# Patient Record
Sex: FEMALE | Race: WHITE | URBAN - METROPOLITAN AREA
[De-identification: names, ages, dates, MRNs, and addresses within clinical notes are randomized per-mention and may not be internally consistent; named-entity substitution may affect disease eponyms.]

---

## 2024-02-26 ENCOUNTER — TELEPHONE (OUTPATIENT)
Dept: FAMILY MEDICINE CLINIC | Facility: CLINIC | Age: 34
End: 2024-02-26

## 2024-02-28 NOTE — TELEPHONE ENCOUNTER
Spoke with patient advising that Dr. Cat declined taking her as a NEW PATIENT - offered new patient appointment with Dr. Kemal Bradshaw or Dr. Lois Bautista and patient stated she will call back.